# Patient Record
Sex: MALE | Race: WHITE | URBAN - METROPOLITAN AREA
[De-identification: names, ages, dates, MRNs, and addresses within clinical notes are randomized per-mention and may not be internally consistent; named-entity substitution may affect disease eponyms.]

---

## 2018-07-02 ENCOUNTER — THERAPY VISIT (OUTPATIENT)
Dept: PHYSICAL THERAPY | Facility: CLINIC | Age: 38
End: 2018-07-02
Payer: COMMERCIAL

## 2018-07-02 DIAGNOSIS — M25.551 BILATERAL HIP PAIN: Primary | ICD-10-CM

## 2018-07-02 DIAGNOSIS — M25.552 BILATERAL HIP PAIN: Primary | ICD-10-CM

## 2018-07-02 PROCEDURE — 97112 NEUROMUSCULAR REEDUCATION: CPT | Mod: GP | Performed by: PHYSICAL THERAPIST

## 2018-07-02 PROCEDURE — 97161 PT EVAL LOW COMPLEX 20 MIN: CPT | Mod: GP | Performed by: PHYSICAL THERAPIST

## 2018-07-02 PROCEDURE — 97140 MANUAL THERAPY 1/> REGIONS: CPT | Mod: GP | Performed by: PHYSICAL THERAPIST

## 2018-07-02 NOTE — MR AVS SNAPSHOT
After Visit Summary   7/2/2018    Lew Craig    MRN: [id number]           Patient Information     Date Of Birth          1980        Visit Information        Provider Department      7/2/2018 7:55 AM Pepe Jesus, PT [facility]        Today's Diagnoses     Bilateral hip pain    -  1       Follow-ups after your visit        Your next 10 appointments already scheduled     Jul 10, 2018  7:55 AM CDT   CORINE Spine with Pepe Jesus [facility] ([facility])    [address]  [facility] [address] [phone]            Jul 17, 2018  7:55 AM CDT   CORINE Spine with Pepe Jesus PT   [facility] ([facility])    [address] [facility] [address] [phone]            Jul 24, 2018  7:55 AM CDT   CORINE Spine with Pepe Jesus PT   [facility] ([facility])    [address] [facility] [id number] [phone]              Who to contact     If you have questions or need follow up information about today's clinic visit or your schedule please contact [place] [facility] directly at [phone].  Normal or non-critical lab and imaging results will be communicated to you by SparkL[employer], letter or phone within 4 business days after the clinic has received the results. If you do not hear from us within 7 days, please contact the clinic through [employer] or phone. If you have a critical or abnormal lab result, we will notify you by phone as soon as possible.  Submit refill requests through [employer] or call your pharmacy and they will forward the refill request to us. Please allow 3 business days for your refill to be completed.          Additional Information About Your Visit        SparkLixhart Information     [employer] gives you secure access to your electronic health record. If you see a primary care provider, you can also send messages to your care team and make appointments. If you have questions,  please call your primary care clinic.  If you do not have a primary care provider, please call [phone] and they will assist you.        Care EveryWhere ID     This is your Care EveryWhere ID. This could be used by other organizations to access your [facility] medical records  [id number]         Blood Pressure from Last 3 Encounters:   08/27/15 110/70   06/28/13 110/82   11/04/11 110/72    Weight from Last 3 Encounters:   08/27/15 83.9 kg (185 lb)   06/28/13 83.6 kg (184 lb 6.4 oz)   11/04/11 85.7 kg (189 lb)              We Performed the Following     HC PT EVAL, LOW COMPLEXITY     CORINE INITIAL EVAL REPORT     MANUAL THER TECH,1+REGIONS,EA 15 MIN     NEUROMUSCULAR RE-EDUCATION        Primary Care Provider Office Phone # Fax #    Camelia SERRANO Pam [phone]       [place] [address]        Equal Access to Services     EDILSON RICARDO : Hadii aad ku hadasho Soomaali, waaxda luqadaha, qaybta kaalmada adeegyada, waxay idiin hayaan marjaneg martitaaralila la'hali . So [facility] [phone].    ATENCIÓN: Si habla español, tiene a saldivar disposición servicios gratuitos de asistencia lingüística. Llame al [phone].    We comply with applicable federal civil rights laws and [place] laws. We do not discriminate on the basis of race, color, national origin, age, disability, sex, sexual orientation, or gender identity.            Thank you!     Thank you for choosing [facility]  for your care. Our goal is always to provide you with excellent care. Hearing back from our patients is one way we can continue to improve our services. Please take a few minutes to complete the written survey that you may receive in the mail after your visit with us. Thank you!             Your Updated Medication List - Protect others around you: Learn how to safely use, store and throw away your medicines at www.disposemymeds.org.          This list is accurate as of 7/2/18   1:16 PM.  Always use your most recent med list.                   Brand Name Dispense Instructions for use Diagnosis    albuterol 108 (90 Base) MCG/ACT Inhaler    PROAIR HFA/PROVENTIL HFA/VENTOLIN HFA     Inhale 2 puffs into the lungs every 6 hours        beclomethasone 40 MCG/ACT Inhaler    QVAR     Inhale 2 puffs into the lungs 2 times daily        BENADRYL PO           CITRACAL + D PO           fluticasone 50 MCG/ACT spray    FLONASE    3 Package    Spray 2 sprays into both nostrils daily.    Allergic rhinitis due to other allergen       mometasone 50 MCG/ACT spray    NASONEX     Spray 2 sprays into both nostrils daily        sulfamethoxazole-trimethoprim 400-80 MG per tablet    BACTRIM/SEPTRA    14 tablet    Take 1 tablet by mouth 2 times daily    Olecranon bursitis of left elbow

## 2018-07-02 NOTE — LETTER
[place] [facility]  [address]  [facility] [address]  [phone]    2018    Re: Lew Craig   :   1980  MRN:  [id number]   REFERRING PHYSICIAN:   Camelia Orozco    [place] [facility]    Date of Initial Evaluation:  2018  Visits:  Rxs Used: 1  Reason for Referral:  Bilateral hip pain    EVALUATION SUMMARY    [facility] Initial Evaluation  Subjective:  Patient is a 38 year old male presenting with rehab right hip hpi.   Lew Craig is a 38 year old male with a right hip and left hip condition.  Condition occurred with:  Running.  Condition occurred: at home.  This is a chronic condition  Pt feels he strained hamstrings running on treadmill starting on 17. Took month off of running before going back to it and felt same shooting pain and tightness in both hamstrings. Currently is biking for exercise but wants to return to treadmill running..    Site of Pain: B hamstring muscle bellies.  Radiates to:  No radiation.  Pain is described as shooting and is intermittent and reported as 1/10.  Associated with: denies. Pain is worse during the day.  Exacerbated by: running, sitting. and relieved by rest.  Since onset symptoms are unchanged.  Special testing: none.  Previous treatment: none.  Improvement with previous treatment: n/a.  General health as reported by patient is good.  Pertinent medical history includes:  Asthma.  Medical allergies: no.  Surgical history: appendectomy.  Current medications:  Sleep medication.  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  Primary job tasks include:  Prolonged standing.Barriers include:  None as reported by the patient.  Red flags:  None as reported by the patient.                      Objective:  Standing Alignment:    Lumbar:  Anterior pelvic tilt    Ankle/Foot Evaluation  PROM:    Endfeel:  B moderate stiffness L > R in limited ankle  DF    Lumbar/SI Evaluation  Neural Tension/Mobility:    Left side:  SLR and SLR w/DF positive.   Right side:   SLR w/DF and SLR positive.        Re: Lew Craig   :   1980         Hip Evaluation  Hip PROM:    Endfeel: B stiffness in moderate loss of hip ER (L > R) and hip extension  Hip Special Testing:    Left hip positive for the following special tests:  SLR   Right hip positive for the following special tests:  SLR         Knee Evaluation:  ROM:  AROM: normal  PROM: normal      Strength:   Quad Set Left: Good    Pain:   Quad Set Right: Good    Pain:    Assessment/Plan:    Patient is a 38 year old male with both sides hip complaints.    Patient has the following significant findings with corresponding treatment plan.                Diagnosis 1:  B hip pain  Pain -  manual therapy, splint/taping/bracing/orthotics, self management, education, directional preference exercise and home program  Decreased ROM/flexibility - manual therapy and therapeutic exercise  Decreased joint mobility - manual therapy and therapeutic exercise  Decreased strength - therapeutic exercise and therapeutic activities  Impaired balance - neuro re-education and therapeutic activities    Therapy Evaluation Codes:   1) History comprised of:   Personal factors that impact the plan of care:      Time since onset of symptoms.    Comorbidity factors that impact the plan of care are:      Asthma.     Medications impacting care: Sleep.  2) Examination of Body Systems comprised of:   Body structures and functions that impact the plan of care:      Hip.   Activity limitations that impact the plan of care are:      Running, Sitting, Sports, Working, Sleeping and Laying down.  3) Clinical presentation characteristics are:   Stable/Uncomplicated.  4) Decision-Making    Low complexity using standardized patient assessment instrument and/or measureable assessment of functional outcome.  Cumulative Therapy Evaluation is: Low  complexity.    Previous and current functional limitations:  (See Goal Flow Sheet for this information)    Short term and Long term goals: (See Goal Flow Sheet for this information)     Communication ability:  Patient appears to be able to clearly communicate and understand verbal and written communication and follow directions correctly.    Re: Lew Craig   :   1980        Treatment Explanation - The following has been discussed with the patient:   RX ordered/plan of care  Anticipated outcomes  Possible risks and side effects  This patient would benefit from PT intervention to resume normal activities.   Rehab potential is good.    Frequency:  1 X week, once daily  Duration:  for 8 weeks  Discharge Plan:  Achieve all LTG.  Independent in home treatment program.  Reach maximal therapeutic benefit.            Thank you for your referral.    INQUIRIES  Therapist: Pepe Jesus DPT  [facility]  [address] [address]  Phone: [phone]

## 2018-07-02 NOTE — PROGRESS NOTES
[place] [facility] Initial Evaluation  Subjective:  Patient is a 38 year old male presenting with rehab right hip hpi.   Lew Craig is a 38 year old male with a right hip and left hip condition.  Condition occurred with:  Running.  Condition occurred: at home.  This is a chronic condition  Pt feels he strained hamstrings running on treadmill starting on 11/1/17. Took month off of running before going back to it and felt same shooting pain and tightness in both hamstrings. Currently is biking for exercise but wants to return to treadmill running..    Site of Pain: B hamstring muscle bellies.  Radiates to:  No radiation.  Pain is described as shooting and is intermittent and reported as 1/10.  Associated with: denies. Pain is worse during the day.  Exacerbated by: running, sitting. and relieved by rest.  Since onset symptoms are unchanged.  Special testing: none.  Previous treatment: none.  Improvement with previous treatment: n/a.  General health as reported by patient is good.  Pertinent medical history includes:  Asthma.  Medical allergies: no.  Surgical history: appendectomy.  Current medications:  Sleep medication.  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  Primary job tasks include:  Prolonged standing.    Barriers include:  None as reported by the patient.    Red flags:  None as reported by the patient.                        Objective:  Standing Alignment:        Lumbar:  Anterior pelvic tilt                      Ankle/Foot Evaluation  ROM:      PROM:                  Endfeel:  B moderate stiffness L > R in limited ankle DF                   Lumbar/SI Evaluation            Neural Tension/Mobility:    Left side:  SLR and SLR w/DF positive.   Right side:   SLR w/DF and SLR positive.                                            Hip Evaluation  Hip PROM:                      Endfeel: B stiffness in moderate loss of hip ER (L > R) and hip extension         Hip Special Testing:    Left hip positive for the following special tests:  SLR   Right hip positive for the following special tests:  SLR           Knee Evaluation:  ROM:  AROM: normal  PROM: normal            Strength:         Quad Set Left: Good    Pain:   Quad Set Right: Good    Pain:                  General     ROS    Assessment/Plan:    Patient is a 38 year old male with both sides hip complaints.    Patient has the following significant findings with corresponding treatment plan.                Diagnosis 1:  B hip pain  Pain -  manual therapy, splint/taping/bracing/orthotics, self management, education, directional preference exercise and home program  Decreased ROM/flexibility - manual therapy and therapeutic exercise  Decreased joint mobility - manual therapy and therapeutic exercise  Decreased strength - therapeutic exercise and therapeutic activities  Impaired balance - neuro re-education and therapeutic activities    Therapy Evaluation Codes:   1) History comprised of:   Personal factors that impact the plan of care:      Time since onset of symptoms.    Comorbidity factors that impact the plan of care are:      Asthma.     Medications impacting care: Sleep.  2) Examination of Body Systems comprised of:   Body structures and functions that impact the plan of care:      Hip.   Activity limitations that impact the plan of care are:      Running, Sitting, Sports, Working, Sleeping and Laying down.  3) Clinical presentation characteristics are:   Stable/Uncomplicated.  4) Decision-Making    Low complexity using standardized patient assessment instrument and/or measureable assessment of functional outcome.  Cumulative Therapy Evaluation is: Low complexity.    Previous and current functional limitations:  (See Goal Flow Sheet for this information)    Short term and Long term goals: (See Goal Flow Sheet for this information)     Communication ability:  Patient appears to be able to clearly communicate and  understand verbal and written communication and follow directions correctly.  Treatment Explanation - The following has been discussed with the patient:   RX ordered/plan of care  Anticipated outcomes  Possible risks and side effects  This patient would benefit from PT intervention to resume normal activities.   Rehab potential is good.    Frequency:  1 X week, once daily  Duration:  for 8 weeks  Discharge Plan:  Achieve all LTG.  Independent in home treatment program.  Reach maximal therapeutic benefit.    Please refer to the daily flowsheet for treatment today, total treatment time and time spent performing 1:1 timed codes.

## 2018-07-10 ENCOUNTER — THERAPY VISIT (OUTPATIENT)
Dept: PHYSICAL THERAPY | Facility: CLINIC | Age: 38
End: 2018-07-10
Payer: COMMERCIAL

## 2018-07-10 DIAGNOSIS — M25.551 BILATERAL HIP PAIN: ICD-10-CM

## 2018-07-10 DIAGNOSIS — M25.552 BILATERAL HIP PAIN: ICD-10-CM

## 2018-07-10 PROCEDURE — 97112 NEUROMUSCULAR REEDUCATION: CPT | Mod: GP | Performed by: PHYSICAL THERAPIST

## 2018-07-10 PROCEDURE — 97110 THERAPEUTIC EXERCISES: CPT | Mod: GP | Performed by: PHYSICAL THERAPIST

## 2018-07-10 PROCEDURE — 97530 THERAPEUTIC ACTIVITIES: CPT | Mod: GP | Performed by: PHYSICAL THERAPIST

## 2018-07-17 ENCOUNTER — THERAPY VISIT (OUTPATIENT)
Dept: PHYSICAL THERAPY | Facility: CLINIC | Age: 38
End: 2018-07-17
Payer: COMMERCIAL

## 2018-07-17 DIAGNOSIS — M25.552 BILATERAL HIP PAIN: ICD-10-CM

## 2018-07-17 DIAGNOSIS — M25.551 BILATERAL HIP PAIN: ICD-10-CM

## 2018-07-17 PROCEDURE — 97112 NEUROMUSCULAR REEDUCATION: CPT | Mod: GP | Performed by: PHYSICAL THERAPIST

## 2018-07-17 PROCEDURE — 97110 THERAPEUTIC EXERCISES: CPT | Mod: GP | Performed by: PHYSICAL THERAPIST

## 2018-07-25 ENCOUNTER — THERAPY VISIT (OUTPATIENT)
Dept: PHYSICAL THERAPY | Facility: CLINIC | Age: 38
End: 2018-07-25
Payer: COMMERCIAL

## 2018-07-25 DIAGNOSIS — M25.552 BILATERAL HIP PAIN: ICD-10-CM

## 2018-07-25 DIAGNOSIS — M25.551 BILATERAL HIP PAIN: ICD-10-CM

## 2018-07-25 PROCEDURE — 97110 THERAPEUTIC EXERCISES: CPT | Mod: GP | Performed by: PHYSICAL THERAPIST

## 2018-07-25 PROCEDURE — 97140 MANUAL THERAPY 1/> REGIONS: CPT | Mod: GP | Performed by: PHYSICAL THERAPIST

## 2018-07-30 ENCOUNTER — THERAPY VISIT (OUTPATIENT)
Dept: PHYSICAL THERAPY | Facility: CLINIC | Age: 38
End: 2018-07-30
Payer: COMMERCIAL

## 2018-07-30 DIAGNOSIS — M25.552 BILATERAL HIP PAIN: ICD-10-CM

## 2018-07-30 DIAGNOSIS — M25.551 BILATERAL HIP PAIN: ICD-10-CM

## 2018-07-30 PROCEDURE — 97112 NEUROMUSCULAR REEDUCATION: CPT | Mod: GP | Performed by: PHYSICAL THERAPIST

## 2018-07-30 PROCEDURE — 97140 MANUAL THERAPY 1/> REGIONS: CPT | Mod: GP | Performed by: PHYSICAL THERAPIST

## 2018-07-30 PROCEDURE — 97110 THERAPEUTIC EXERCISES: CPT | Mod: GP | Performed by: PHYSICAL THERAPIST

## 2018-08-08 ENCOUNTER — THERAPY VISIT (OUTPATIENT)
Dept: PHYSICAL THERAPY | Facility: CLINIC | Age: 38
End: 2018-08-08
Payer: COMMERCIAL

## 2018-08-08 DIAGNOSIS — M25.552 BILATERAL HIP PAIN: ICD-10-CM

## 2018-08-08 DIAGNOSIS — M25.551 BILATERAL HIP PAIN: ICD-10-CM

## 2018-08-08 PROCEDURE — 97140 MANUAL THERAPY 1/> REGIONS: CPT | Mod: GP | Performed by: PHYSICAL THERAPIST

## 2018-08-08 PROCEDURE — 97110 THERAPEUTIC EXERCISES: CPT | Mod: GP | Performed by: PHYSICAL THERAPIST

## 2018-08-08 NOTE — MR AVS SNAPSHOT
After Visit Summary   8/8/2018    Lew Craig    MRN: [id number]           Patient Information     Date Of Birth          1980        Visit Information        Provider Department      8/8/2018 7:55 AM Pepe Jesus PT [place] [facility]        Today's Diagnoses     Bilateral hip pain           Follow-ups after your visit        Your next 10 appointments already scheduled     Aug 21, 2018  5:05 PM CDT   CORINE Spine with Pepe Jesus PT   [place] [facility] ([facility])    [address] [facility] [address] [phone]              Who to contact     If you have questions or need follow up information about today's clinic visit or your schedule please contact [place] [facility] directly at [phone].  Normal or non-critical lab and imaging results will be communicated to you by MyChart, letter or phone within 4 business days after the clinic has received the results. If you do not hear from us within 7 days, please contact the clinic through [employer] or phone. If you have a critical or abnormal lab result, we will notify you by phone as soon as possible.  Submit refill requests through [employer] or call your pharmacy and they will forward the refill request to us. Please allow 3 business days for your refill to be completed.          Additional Information About Your Visit        MyChart Information     [employer] gives you secure access to your electronic health record. If you see a primary care provider, you can also send messages to your care team and make appointments. If you have questions, please call your primary care clinic.  If you do not have a primary care provider, please call [phone] and they will assist you.        Care EveryWhere ID     This is your Care EveryWhere ID. This could be used by other organizations to access your [place] medical records  [id number]         Blood Pressure from Last 3 Encounters:   08/27/15 110/70    06/28/13 110/82   11/04/11 110/72    Weight from Last 3 Encounters:   08/27/15 83.9 kg (185 lb)   06/28/13 83.6 kg (184 lb 6.4 oz)   11/04/11 85.7 kg (189 lb)              We Performed the Following     MANUAL THER TECH,1+REGIONS,EA 15 MIN     THERAPEUTIC EXERCISES        Primary Care Provider Office Phone # Fax #    Camelia Orozco [phone]       [place] [address]        Equal Access to Services     [facility]: Hadii aad ku hadasho Soomaali, waaxda luqadaha, qaybta kaalmada adeegyada, waxay loli go . So [facility] [phone].    ATENCIÓN: Si habla español, tiene a saldivar disposición servicios gratuitos de asistencia lingüística. [facility] [phone].    We comply with applicable federal civil rights laws and [place] laws. We do not discriminate on the basis of race, color, national origin, age, disability, sex, sexual orientation, or gender identity.            Thank you!     Thank you for choosing [facility]  for your care. Our goal is always to provide you with excellent care. Hearing back from our patients is one way we can continue to improve our services. Please take a few minutes to complete the written survey that you may receive in the mail after your visit with us. Thank you!             Your Updated Medication List - Protect others around you: Learn how to safely use, store and throw away your medicines at www.disposemymeds.org.          This list is accurate as of 8/8/18  8:34 AM.  Always use your most recent med list.                   Brand Name Dispense Instructions for use Diagnosis    albuterol 108 (90 Base) MCG/ACT Inhaler    PROAIR HFA/PROVENTIL HFA/VENTOLIN HFA     Inhale 2 puffs into the lungs every 6 hours        beclomethasone 40 MCG/ACT Inhaler    QVAR     Inhale 2 puffs into the lungs 2 times daily        BENADRYL PO           CITRACAL + D PO           fluticasone 50 MCG/ACT  spray    FLONASE    3 Package    Spray 2 sprays into both nostrils daily.    Allergic rhinitis due to other allergen       mometasone 50 MCG/ACT spray    NASONEX     Spray 2 sprays into both nostrils daily        sulfamethoxazole-trimethoprim 400-80 MG per tablet    BACTRIM/SEPTRA    14 tablet    Take 1 tablet by mouth 2 times daily    Olecranon bursitis of left elbow

## 2018-08-21 ENCOUNTER — THERAPY VISIT (OUTPATIENT)
Dept: PHYSICAL THERAPY | Facility: CLINIC | Age: 38
End: 2018-08-21
Payer: COMMERCIAL

## 2018-08-21 DIAGNOSIS — M25.552 BILATERAL HIP PAIN: ICD-10-CM

## 2018-08-21 DIAGNOSIS — M25.551 BILATERAL HIP PAIN: ICD-10-CM

## 2018-08-21 PROCEDURE — 97530 THERAPEUTIC ACTIVITIES: CPT | Mod: GP | Performed by: PHYSICAL THERAPIST

## 2018-10-04 PROBLEM — M25.551 BILATERAL HIP PAIN: Status: RESOLVED | Noted: 2018-07-02 | Resolved: 2018-10-04

## 2018-10-04 PROBLEM — M25.552 BILATERAL HIP PAIN: Status: RESOLVED | Noted: 2018-07-02 | Resolved: 2018-10-04

## 2020-02-10 ENCOUNTER — HEALTH MAINTENANCE LETTER (OUTPATIENT)
Age: 40
End: 2020-02-10

## 2020-11-16 ENCOUNTER — HEALTH MAINTENANCE LETTER (OUTPATIENT)
Age: 40
End: 2020-11-16

## 2021-04-04 ENCOUNTER — HEALTH MAINTENANCE LETTER (OUTPATIENT)
Age: 41
End: 2021-04-04

## 2021-09-18 ENCOUNTER — HEALTH MAINTENANCE LETTER (OUTPATIENT)
Age: 41
End: 2021-09-18

## 2022-04-30 ENCOUNTER — HEALTH MAINTENANCE LETTER (OUTPATIENT)
Age: 42
End: 2022-04-30

## 2022-11-20 ENCOUNTER — HEALTH MAINTENANCE LETTER (OUTPATIENT)
Age: 42
End: 2022-11-20

## 2023-06-01 ENCOUNTER — HEALTH MAINTENANCE LETTER (OUTPATIENT)
Age: 43
End: 2023-06-01